# Patient Record
Sex: MALE | Race: BLACK OR AFRICAN AMERICAN
[De-identification: names, ages, dates, MRNs, and addresses within clinical notes are randomized per-mention and may not be internally consistent; named-entity substitution may affect disease eponyms.]

---

## 2018-10-05 ENCOUNTER — HOSPITAL ENCOUNTER (EMERGENCY)
Dept: HOSPITAL 72 - EMR | Age: 23
Discharge: HOME | End: 2018-10-05
Payer: COMMERCIAL

## 2018-10-05 VITALS — WEIGHT: 150 LBS | BODY MASS INDEX: 20.32 KG/M2 | HEIGHT: 72 IN

## 2018-10-05 VITALS — SYSTOLIC BLOOD PRESSURE: 115 MMHG | DIASTOLIC BLOOD PRESSURE: 75 MMHG

## 2018-10-05 DIAGNOSIS — K21.9: ICD-10-CM

## 2018-10-05 DIAGNOSIS — R10.13: ICD-10-CM

## 2018-10-05 DIAGNOSIS — G44.209: Primary | ICD-10-CM

## 2018-10-05 DIAGNOSIS — F15.10: ICD-10-CM

## 2018-10-05 PROCEDURE — 99282 EMERGENCY DEPT VISIT SF MDM: CPT

## 2018-10-05 NOTE — EMERGENCY ROOM REPORT
History of Present Illness


General


Source:  Patient





Present Illness


HPI


This is a 22-year-old male with no past medical history.  He presents with 

chief complaint of sore throat and headache.  He called 911 because he said his 

been having sore throat for the last week.  Woke up with pain from the 

epigastric area going up his chest into his throat.  No nausea no vomiting.  No 

fever or chills.  Also with headache in the back of his head.  No focal 

deficit.  Pain is 7 out of 10.  Has not take anything for this.  He was 

concerned because he has been using methamphetamine for the last few years.  

Denies any focal deficit.  Denies any other complaint.


Allergies:  


Coded Allergies:  


     No Known Allergies (Unverified , 10/5/18)





Patient History


Past Medical History:  see triage record, old chart reviewed


Past Surgical History:  none


Pertinent Family History:  none


Social History:  Reports: drug use


Immunizations:  other


Reviewed Nursing Documentation:  PMH: Agreed; PSxH: Agreed





Review of Systems


Eye:  Denies: eye pain, blurred vision


ENT:  Reports: throat pain; Denies: ear pain, nose congestion, throat swelling


Respiratory:  Denies: cough, shortness of breath


Cardiovascular:  Denies: chest pain, palpitations


Gastrointestinal:  Denies: abdominal pain, diarrhea, nausea, vomiting


Musculoskeletal:  Denies: back pain, joint pain


Skin:  Denies: rash


Neurological:  Denies: headache, numbness


Endocrine:  Denies: increased thirst, increased urine


Hematologic/Lymphatic:  Denies: easy bruising


All Other Systems:  negative except mentioned in HPI





Physical Exam


vital signs unremarkable


Sp02 EP Interpretation:  reviewed, normal


General Appearance:  well appearing, no apparent distress, alert


Head:  normocephalic, atraumatic


Eyes:  bilateral eye PERRL, bilateral eye EOMI


ENT:  hearing grossly normal, normal pharynx


Neck:  full range of motion, supple, no meningismus


Respiratory:  chest non-tender, lungs clear, normal breath sounds


Cardiovascular #1:  regular rate, rhythm, no murmur


Gastrointestinal:  normal bowel sounds, non tender, no mass, no organomegaly, 

no bruit, non-distended


Musculoskeletal:  back normal, gait/station normal, normal range of motion


Psychiatric:  mood/affect normal


Skin:  warm/dry





Medical Decision Making


Diagnostic Impression:  


 Primary Impression:  


 Headache


 Qualified Codes:  G44.209 - Tension-type headache, unspecified, not intractable


 Additional Impressions:  


 GERD (gastroesophageal reflux disease)


 Qualified Codes:  K21.9 - Gastro-esophageal reflux disease without esophagitis


 Methamphetamine abuse


ER Course


Patient present with symptoms consistent with GERD.  No evidence of ACS, PE, 

dissection.  No evidence of any peritonsillar abscess, strep throat to name a 

few.  No evidence of any stricture.  Headache is tension-like in nature.  No 

evidence of any bleed.  No focal deficit.  We'll discharge home.


Status:  improved


Disposition:  HOME, SELF-CARE


Condition:  Stable


Scripts


Omeprazole Magnesium (PRILOSEC OTC) 20 Mg Tablet.


20 MG ORAL DAILY, #30 TAB


   Prov: Jesse Rosas MD         10/5/18





Additional Instructions:  


Follow-up with your doctor in 7 days.  Stop using drugs.  Return if symptom 

worsen.











Jesse Rosas MD Oct 5, 2018 01:13